# Patient Record
Sex: MALE | Race: WHITE | NOT HISPANIC OR LATINO | URBAN - METROPOLITAN AREA
[De-identification: names, ages, dates, MRNs, and addresses within clinical notes are randomized per-mention and may not be internally consistent; named-entity substitution may affect disease eponyms.]

---

## 2018-08-30 ENCOUNTER — EMERGENCY (EMERGENCY)
Facility: HOSPITAL | Age: 18
LOS: 1 days | End: 2018-08-30
Attending: EMERGENCY MEDICINE
Payer: SELF-PAY

## 2018-08-30 VITALS
RESPIRATION RATE: 19 BRPM | DIASTOLIC BLOOD PRESSURE: 74 MMHG | SYSTOLIC BLOOD PRESSURE: 121 MMHG | TEMPERATURE: 98 F | OXYGEN SATURATION: 97 % | HEART RATE: 95 BPM

## 2018-08-30 VITALS — HEIGHT: 66 IN | WEIGHT: 160.06 LBS

## 2018-08-30 PROCEDURE — 99283 EMERGENCY DEPT VISIT LOW MDM: CPT

## 2018-08-30 PROCEDURE — 73130 X-RAY EXAM OF HAND: CPT | Mod: 26,LT

## 2018-08-30 PROCEDURE — 73130 X-RAY EXAM OF HAND: CPT

## 2018-08-30 NOTE — ED STATDOCS - PROGRESS NOTE DETAILS
NP NOTE:  HPI, ROS, PE of intake doctor reviewed and agree.  x-ray without acute fx, will d/c home, ibuprofen for pain as needed.

## 2018-08-30 NOTE — ED STATDOCS - SKIN, MLM
skin normal color for race, warm, dry and intact. L hand darkened, dirty form bus, minimal swelling to 3rd digit and tenderness medial distal pharynx

## 2018-08-30 NOTE — ED STATDOCS - OBJECTIVE STATEMENT
18 y/o M pt with no pertinent medical hx presents to ED c/o L hand pain. 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   Pt has pain to fingers. Denies bleeding, lacerations, numbness, and tingling. No further complaints at this time.

## 2020-10-17 ENCOUNTER — TRANSCRIPTION ENCOUNTER (OUTPATIENT)
Age: 20
End: 2020-10-17

## 2021-01-06 ENCOUNTER — TRANSCRIPTION ENCOUNTER (OUTPATIENT)
Age: 21
End: 2021-01-06

## 2021-01-18 ENCOUNTER — TRANSCRIPTION ENCOUNTER (OUTPATIENT)
Age: 21
End: 2021-01-18

## 2021-02-06 ENCOUNTER — TRANSCRIPTION ENCOUNTER (OUTPATIENT)
Age: 21
End: 2021-02-06

## 2021-03-16 ENCOUNTER — TRANSCRIPTION ENCOUNTER (OUTPATIENT)
Age: 21
End: 2021-03-16

## 2022-03-11 ENCOUNTER — TRANSCRIPTION ENCOUNTER (OUTPATIENT)
Age: 22
End: 2022-03-11

## 2022-05-25 ENCOUNTER — NON-APPOINTMENT (OUTPATIENT)
Age: 22
End: 2022-05-25

## 2022-05-27 ENCOUNTER — EMERGENCY (EMERGENCY)
Facility: HOSPITAL | Age: 22
LOS: 1 days | Discharge: HOME | End: 2022-05-27
Attending: EMERGENCY MEDICINE | Admitting: EMERGENCY MEDICINE
Payer: MEDICAID

## 2022-05-27 VITALS
SYSTOLIC BLOOD PRESSURE: 102 MMHG | TEMPERATURE: 97 F | HEART RATE: 84 BPM | WEIGHT: 315 LBS | HEIGHT: 66 IN | RESPIRATION RATE: 20 BRPM | DIASTOLIC BLOOD PRESSURE: 65 MMHG | OXYGEN SATURATION: 97 %

## 2022-05-27 VITALS
SYSTOLIC BLOOD PRESSURE: 112 MMHG | HEART RATE: 88 BPM | OXYGEN SATURATION: 99 % | RESPIRATION RATE: 16 BRPM | TEMPERATURE: 98 F | DIASTOLIC BLOOD PRESSURE: 66 MMHG

## 2022-05-27 PROCEDURE — 73090 X-RAY EXAM OF FOREARM: CPT

## 2022-05-27 PROCEDURE — 73090 X-RAY EXAM OF FOREARM: CPT | Mod: 26,RT

## 2022-05-27 PROCEDURE — 99284 EMERGENCY DEPT VISIT MOD MDM: CPT

## 2022-05-27 PROCEDURE — 99283 EMERGENCY DEPT VISIT LOW MDM: CPT | Mod: 25

## 2022-05-27 RX ADMIN — Medication 1 TABLET(S): at 22:18

## 2022-05-27 NOTE — ED PROVIDER NOTE - NSICDXNOPASTSURGICALHX_GEN_ALL_ED
Can the Color Flow Doppler Carotid Bilateral order be changed to a Carotid Doppler? The hub said they don't do the first one at [facility] but they can get her in soon if the order is changed to a Carotid Doppler. Thanks   <-- Click to add NO significant Past Surgical History

## 2022-05-27 NOTE — ED PROVIDER NOTE - CLINICAL SUMMARY MEDICAL DECISION MAKING FREE TEXT BOX
Pt is a 20 yo male with pmhx of depression anxiety here for right forearm wound. Pt states he was working and got punctured with large splinter which he removed himself went to urgent care and got sutures and xray. Pt was started on abx. Pt denies any fever chills. pt states called by  an told to go to er as gas on xray and could be infection.  pt mother concerned that pt got injury punching wood and cloth divider but no s/h ideas, no hx si, sees therapist and didn't threaten mother.  pt on exam with right forearm with ulnar aspect with 2 sutures and mild swelling over distal forearm, compartment soft, no redness, no crepitus, xray reviewed with rads and normal post puncture, d/c on abx, wound care,

## 2022-05-27 NOTE — ED PROVIDER NOTE - MUSCULOSKELETAL, MLM
Spine appears normal, range of motion is not limited, no muscle or joint tenderness right forearm small wound with 2 sutures in place cdi no drainage no crepitus no surrounding erythema nvi

## 2022-05-27 NOTE — ED PROVIDER NOTE - CARE PROVIDER_API CALL
Cheko Barney (DPM)  Podiatric Medicine and Surgery  [address] [place] [address]  Phone: [phone]  Fax: [phone]  Follow Up Time:

## 2022-05-27 NOTE — ED ADULT TRIAGE NOTE - CHIEF COMPLAINT QUOTE
Patient referred from urgent care, s/p removal of foreign object from right forearm yesterday, with increasing up right arm pain, patient worried about possible infection

## 2022-05-27 NOTE — ED PROVIDER NOTE - OBJECTIVE STATEMENT
Pt is a 20 yo male with pmhx of depression anxiety here for right forearm wound. Pt states he was working and got punctured with large splinter which he removed himself went to urgent care and got sutures and xray. Pt was started on abx. Pt denies any fever chills. Pt having pain in arm and got concerned for infection denies any drainage fever chills numbness tingling weakness. Pt denies any si hi. mother concerned pt may have done this on purpose which pt denies. Pt has therapist he sees out pt.

## 2022-05-27 NOTE — ED PROVIDER NOTE - PATIENT PORTAL LINK FT
You can access the FollowMyHealth Patient Portal offered by [facility] by registering at the following website: [email or web address]. By joining [employer]’s FollowMyHealth portal, you will also be able to view your health information using other applications (apps) compatible with our system. weakness

## 2022-05-27 NOTE — ED PROVIDER NOTE - NS ED ATTENDING STATEMENT MOD
This was a shared visit with the [facility]. I reviewed and verified the documentation and independently performed the documented:

## 2022-05-27 NOTE — ED PROVIDER NOTE - NSFOLLOWUPINSTRUCTIONS_ED_ALL_ED_FT
Return to er for any worsening symptoms  take antibiotics as directed    Puncture Wound      A puncture wound is an injury that is caused by a sharp, thin object that goes through your skin. A puncture wound usually does not leave a large opening in your skin, so it may not bleed a lot. However, when you get a puncture wound, dirt or other materials (foreign bodies) can be forced into your wound and can break off inside. This increases the chance of infection, such as tetanus. There are many sharp, pointed objects that can cause puncture wounds, including teeth, nails, splinters of glass, fishhooks, and needles.    Treatment may include the following steps:  •Washing out the wound with a germ-free (sterile) salt-water solution.      •Having surgery to open the wound and remove materials from it.      •Closing the wound with stitches (sutures).      •Covering the wound with antibiotic ointment and a bandage (dressing).      Depending on what caused the injury, you may also need a tetanus shot or a rabies shot.      Follow these instructions at home:    Medicines     •Take or apply over-the-counter and prescription medicines only as told by your doctor.      •If you were prescribed an antibiotic medicine, take or apply it as told by your doctor. Do not stop using the antibiotic even if your condition starts to get better.      Bathing     •Keep the bandage dry as told by your doctor.      • Do not take baths, swim, or use a hot tub until your doctor approves. Ask your doctor if you may take showers. You may only be allowed to take sponge baths.        Wound care    •There are many ways to close and cover a wound. For example, a wound can be closed with stitches, skin glue, or skin tape (adhesive strips). Follow instructions from your doctor about how to take care of your wound. Make sure you:  •Wash your hands with soap and water before and after you change your bandage. If you cannot use soap and water, use hand .      •Change your bandage as told by your doctor.      •Leave stitches, skin glue, or skin tape strips in place. They may need to stay in place for 2 weeks or longer. If tape strips get loose and curl up, you may trim the loose edges. Do not remove tape strips completely unless your doctor says it is okay.        •Clean the wound as told by your doctor.      • Do not scratch or pick at the wound.    •Check your wound every day for signs of infection. Watch for:  •Redness, swelling, or pain.      •Fluid or blood.      •Warmth.      •Pus or a bad smell.        General instructions     •Raise (elevate) the injured area above the level of your heart while you are sitting or lying down.      •If your puncture wound is in your foot, ask your doctor if you need to avoid putting weight on your foot and for how long. Use crutches as told by your doctor.      •Keep all follow-up visits as told by your doctor. This is important.        Contact a doctor if:  •You got a tetanus shot and you have any of these problems at the injection site:  •Swelling.      •Very bad pain.      •Redness.      •Bleeding.        •You have a fever.      •Your stitches come out.      •You notice a bad smell coming from your wound or your bandage.      •You notice something coming out of the wound, such as wood or glass.      •Medicine does not help your pain.      •You have more redness, swelling, or pain at the site of your wound.      •You have fluid, blood, or pus coming from your wound.      •You notice a change in the color of your skin near your wound.      •You need to change the bandage often because fluid, blood, or pus is coming from the wound.      •You start to have a new rash.      •You start to lose feeling (have numbness) around the wound.      •You have warmth around your wound.        Get help right away if:    •You have very bad swelling around the wound.      •Your pain quickly gets worse and is very bad.      •You start to get painful skin lumps.      •You have a red streak going away from your wound.    •The wound is on your hand or foot and you:  •Cannot move a finger or toe like normal.      •Notice that your fingers or toes look pale or blue.          Summary    •A puncture wound is an injury that is caused by a sharp, thin object that goes through your skin.      •Treatment may include washing out the wound, having surgery to open the wound to clean it, closing the wound, and covering the wound with a bandage.      •Follow instructions from your doctor about how to take care of your wound.      •Contact your doctor if you have more redness, swelling, or pain at the site of your wound.      •Keep all follow-up visits as told by your doctor. This is important.      This information is not intended to replace advice given to you by your health care provider. Make sure you discuss any questions you have with your health care provider.      Document Revised: 04/27/2021 Document Reviewed: 07/25/2019    Elsevier Patient Education © 2022 [employer]

## 2022-05-27 NOTE — ED ADULT NURSE NOTE - OBJECTIVE STATEMENT
pt here for evaluation of a puncture wound to his right forearm  was seen and treated at urgent care

## 2022-09-26 ENCOUNTER — NON-APPOINTMENT (OUTPATIENT)
Age: 22
End: 2022-09-26

## 2022-10-09 ENCOUNTER — NON-APPOINTMENT (OUTPATIENT)
Age: 22
End: 2022-10-09

## 2023-05-25 ENCOUNTER — NON-APPOINTMENT (OUTPATIENT)
Age: 23
End: 2023-05-25

## 2024-11-12 NOTE — ED ADULT NURSE NOTE - NSHOSCREENINGQ1_ED_ALL_ED
I spoke with pt and she said that the current provider that takes care of her hearing aids no longer takes her [employer] insurance.she is asking to be referred to someone that will expect her insurance and she does not have a preference.    No